# Patient Record
Sex: MALE | Race: WHITE
[De-identification: names, ages, dates, MRNs, and addresses within clinical notes are randomized per-mention and may not be internally consistent; named-entity substitution may affect disease eponyms.]

---

## 2021-05-02 ENCOUNTER — HOSPITAL ENCOUNTER (EMERGENCY)
Dept: HOSPITAL 33 - ED | Age: 76
Discharge: ANOTHER HOSPITAL | End: 2021-05-02
Payer: MEDICARE

## 2021-05-02 VITALS — HEART RATE: 88 BPM | SYSTOLIC BLOOD PRESSURE: 100 MMHG | DIASTOLIC BLOOD PRESSURE: 60 MMHG

## 2021-05-02 VITALS — OXYGEN SATURATION: 97 %

## 2021-05-02 DIAGNOSIS — I26.99: Primary | ICD-10-CM

## 2021-05-02 LAB
ALBUMIN SERPL-MCNC: 4.4 G/DL (ref 3.5–5)
ALP SERPL-CCNC: 66 U/L (ref 38–126)
ALT SERPL-CCNC: 11 U/L (ref 0–50)
ANION GAP SERPL CALC-SCNC: 11.9 MEQ/L (ref 5–15)
APTT PPP: 36.3 SECONDS (ref 24.1–36.1)
ARTERIAL PATENCY WRIST A: YES
AST SERPL QL: 33 U/L (ref 17–59)
BASE EXCESS BLDA CALC-SCNC: 3.4 MMOL/L (ref -2–2)
BASOPHILS # BLD AUTO: 0.03 10*3/UL (ref 0–0.4)
BASOPHILS NFR BLD AUTO: 0.2 % (ref 0–0.4)
BILIRUB BLD-MCNC: 1 MG/DL (ref 0.2–1.3)
BNP SERPL-MCNC: 989 PG/ML (ref 0–900)
BUN SERPL-MCNC: 24 MG/DL (ref 9–20)
CALCIUM SPEC-MCNC: 9.2 MG/DL (ref 8.4–10.2)
CHLORIDE SERPL-SCNC: 101 MMOL/L (ref 98–107)
CO2 SERPL-SCNC: 28 MMOL/L (ref 22–30)
COHGB BLD-MCNC: 0.6 % THGB (ref 0–6.9)
CREAT SERPL-MCNC: 0.98 MG/DL (ref 0.66–1.25)
EOSINOPHIL # BLD AUTO: 0.02 10*3/UL (ref 0–0.5)
GAS PNL BLDA: 15.4
GAS PNL BLDA: 37 C
GFR SERPLBLD BASED ON 1.73 SQ M-ARVRAT: > 60 ML/MIN
GLUCOSE SERPL-MCNC: 130 MG/DL (ref 74–106)
GLUCOSE UR-MCNC: NEGATIVE MG/DL
HCO3 BLDA-SCNC: 26.8 MMOL/L (ref 22–28)
HCT VFR BLD AUTO: 47.9 % (ref 42–50)
HGB BLD-MCNC: 15.3 GM/DL (ref 12.5–18)
INHALED O2 CONCENTRATION: 60 %
INR PPP: 1.52 (ref 0.8–3)
LYMPHOCYTES # SPEC AUTO: 1.17 10*3/UL (ref 1–4.6)
MCH RBC QN AUTO: 31.4 PG (ref 26–32)
MCHC RBC AUTO-ENTMCNC: 31.9 G/DL (ref 32–36)
METHGB MFR BLDA: 1 % (ref 1.4–1.5)
MONOCYTES # BLD AUTO: 1.14 10*3/UL (ref 0–1.3)
O2 A-A PPRESDIFF RESPIRATORY: 112 MM[HG]
PCO2 BLDA: 36 MMHG (ref 35–45)
PLATELET # BLD AUTO: 173 K/MM3 (ref 150–450)
PO2 BLDA: 271 MMHG (ref 75–100)
POTASSIUM BLDA-SCNC: 4.6 MMOL/L (ref 3.5–5.1)
POTASSIUM SERPLBLD-SCNC: 4.7 MMOL/L (ref 3.5–5.1)
PROT SERPL-MCNC: 7.7 G/DL (ref 6.3–8.2)
PROT UR STRIP-MCNC: 100 MG/DL
PROTHROMBIN TIME: 17.2 SECONDS (ref 8.83–12.87)
RBC # BLD AUTO: 4.87 M/MM3 (ref 4.1–5.6)
RBC #/AREA URNS HPF: >101 /HPF (ref 0–2)
SAO2 % BLDA: 97.3 G/DF (ref 94–100)
SAO2 % BLDA: 98.9 % (ref 95–100)
SODIUM SERPL-SCNC: 136 MMOL/L (ref 137–145)
SPECIMEN SOURCE: (no result)
WBC # BLD AUTO: 13.9 K/MM3 (ref 4–10.5)
WBC #/AREA URNS HPF: (no result) /HPF (ref 0–5)

## 2021-05-02 PROCEDURE — 94760 N-INVAS EAR/PLS OXIMETRY 1: CPT

## 2021-05-02 PROCEDURE — 83605 ASSAY OF LACTIC ACID: CPT

## 2021-05-02 PROCEDURE — 36415 COLL VENOUS BLD VENIPUNCTURE: CPT

## 2021-05-02 PROCEDURE — 80053 COMPREHEN METABOLIC PANEL: CPT

## 2021-05-02 PROCEDURE — 87086 URINE CULTURE/COLONY COUNT: CPT

## 2021-05-02 PROCEDURE — 71045 X-RAY EXAM CHEST 1 VIEW: CPT

## 2021-05-02 PROCEDURE — 99285 EMERGENCY DEPT VISIT HI MDM: CPT

## 2021-05-02 PROCEDURE — 85025 COMPLETE CBC W/AUTO DIFF WBC: CPT

## 2021-05-02 PROCEDURE — 93041 RHYTHM ECG TRACING: CPT

## 2021-05-02 PROCEDURE — 51702 INSERT TEMP BLADDER CATH: CPT

## 2021-05-02 PROCEDURE — 82375 ASSAY CARBOXYHB QUANT: CPT

## 2021-05-02 PROCEDURE — 71260 CT THORAX DX C+: CPT

## 2021-05-02 PROCEDURE — 84484 ASSAY OF TROPONIN QUANT: CPT

## 2021-05-02 PROCEDURE — 99291 CRITICAL CARE FIRST HOUR: CPT

## 2021-05-02 PROCEDURE — 96368 THER/DIAG CONCURRENT INF: CPT

## 2021-05-02 PROCEDURE — 96374 THER/PROPH/DIAG INJ IV PUSH: CPT

## 2021-05-02 PROCEDURE — 85610 PROTHROMBIN TIME: CPT

## 2021-05-02 PROCEDURE — 36600 WITHDRAWAL OF ARTERIAL BLOOD: CPT

## 2021-05-02 PROCEDURE — 82803 BLOOD GASES ANY COMBINATION: CPT

## 2021-05-02 PROCEDURE — 83880 ASSAY OF NATRIURETIC PEPTIDE: CPT

## 2021-05-02 PROCEDURE — 81001 URINALYSIS AUTO W/SCOPE: CPT

## 2021-05-02 PROCEDURE — 96375 TX/PRO/DX INJ NEW DRUG ADDON: CPT

## 2021-05-02 PROCEDURE — 94002 VENT MGMT INPAT INIT DAY: CPT

## 2021-05-02 PROCEDURE — 96365 THER/PROPH/DIAG IV INF INIT: CPT

## 2021-05-02 PROCEDURE — 93005 ELECTROCARDIOGRAM TRACING: CPT

## 2021-05-02 PROCEDURE — 85730 THROMBOPLASTIN TIME PARTIAL: CPT

## 2021-05-02 PROCEDURE — 87040 BLOOD CULTURE FOR BACTERIA: CPT

## 2021-05-02 NOTE — ERPHSYRPT
- History of Present Illness


Source: EMS, other (Wife who arrived later)


Exam Limitations: clinical condition


Patient Subjective Stated Complaint: pt here for increase sob today at 1200. 

with a fever


Triage Nursing Assessment: pt arrived per ems on C PAP on, resp labored, pt 

moaning, he is able to answer simple questions, skin hot, dry,pink, chest with 

diminished BS heard


Physician History: 





74 yo wm arrived per EMS on Bipap in respiratory distress. Pt has a h/o 

IPF/HTN/pacer/4L O2 dep. History provided by wife who arrived later. She states 

that pt was dropped from lung transplant list due to obesity. Pt febrile w good 

sats when placed on ER Bipap but very tachyneic and in somewhat distress. CXR 

demonstrated cardiomegaly w pulmonary vascular congestion, so 100mg IV lasix 

given w improvement in condition. He does not want to be intubated per wife.


Timing/Duration: other (6hr)


Activities at Onset: rest


Severity of Dyspnea-Max: severe


Severity of Dyspnea-Current: severe


Possible Cause: frequent episodes


Modifying Factors: Improves With: exertion


Associated Symptoms: edema, fever, No cough, No chest pain/discomfort, No 

insomnia, No loss of appetite, No lightheadedness, No wheezing, No weakness, No 

ankle swelling, No chills, No hemoptysis, No calf pain, No dizziness, No 

heaviness, No heart racing, No lightheadedness, No leg swelling, No muscle 

spasms feet, No muscle spasms hands, No painful breathing, No productive cough


Allergies/Adverse Reactions: 








atorvastatin calcium [From Lipitor] Allergy (Verified 05/02/21 17:51)


   





Hx Tetanus, Diphtheria Vaccination/Date Given: Yes (2005)


Hx Influenza Vaccination/Date Given: Yes (2012)


Hx Pneumococcal Vaccination/Date Given: Yes (2007)


Immunizations Up to Date: Yes





Travel Risk





- International Travel


Have you traveled outside of the country in past 3 weeks: No





- Coronavirus Screening


Are you exhibiting any of the following symptoms?: Yes


Symptoms: Fever


Close contact with a COVID-19 positive Pt in past 14-21 Days: Yes





- Vaccine Status


Have you recieved a Covid-19 vaccination: Yes


: Unknown





- Vaccination Dates


Dates if Unknown: unknown





- Review of Systems


Constitutional: Fever


Eyes: No Symptoms


Ears, Nose, & Throat: No Symptoms


Respiratory: Dyspnea


Cardiac: No Symptoms


Abdominal/Gastrointestinal: No Symptoms


Genitourinary Symptoms: No Symptoms


Musculoskeletal: No Symptoms


Skin: No Symptoms


Neurological: No Symptoms


Psychological: No Symptoms


Endocrine: No Symptoms


Hematologic/Lymphatic: No Symptoms


Immunological/Allergic: No Symptoms





- Past Medical History


Pertinent Past Medical History: Yes


Cardiac History: Coronary Artery Disease, High Cholesterol, Hypertension





- Past Surgical History


Past Surgical History: Yes


Musculoskeletal: Joint Replacement, Orthopedic Surgery


Other Surgical History: SHOULDER SURGERY, HEMORRHOIDS





- Social History


Smoking Status: Never smoker


Exposure to second hand smoke: No


Drug Use: none


Patient Lives Alone: No





- Nursing Vital Signs


Nursing Vital Signs: 


                               Initial Vital Signs











Temperature  101.7 F   05/02/21 17:51


 


Pulse Rate  111 H  05/02/21 17:51


 


Respiratory Rate  22   05/02/21 17:51


 


Blood Pressure  122/68   05/02/21 17:51


 


O2 Sat by Pulse Oximetry  97   05/02/21 17:51








                                   Pain Scale











Pain Intensity                 0

















- Physical Exam


General Appearance: moderate distress


Eye Exam: PERRL/EOMI, eyes nml inspection


Ears, Nose, Throat Exam: normal ENT inspection


Neck Exam: normal inspection


Respiratory Exam: respiratory distress, airway intact, diminished breath sounds 

(B bases)


Cardiovascular/Chest Exam: tachycardia, No murmur


Abdominal/Gastrointestinal Exam: soft, normal bowel sounds (Morbidly obese)


Extremity Exam: pedal edema (1+)


Neurologic Exam: alert, oriented x 3, cooperative


Skin Exam: normal color, warm, dry


Lymphatic Exam: No adenopathy


**SpO2 Interpretation**: normal


SpO2: 97


O2 Delivery: Room Air





- Course


Nursing assessment & vital signs reviewed: Yes


EKG Interpreted by Me: RATE (Afib/Normal QT-QTc/IVCD/Nonspecific ST-Twave 

changes)





- Radiology Exams


  ** Chest


X-ray Interpretation: Interpreted by me (Pacer/Cardiomegaly w pulmonary vascular

congestion)





- CT Exams


  ** Chest


CT Interpretation: Tele-radiologist Report (CTA of chest w multiple RLL PE/R 

heart strain)


Ordered Tests: 


                               Active Orders 24 hr











 Category Date Time Status


 


 EKG-ER Only STAT Care  05/02/21 17:52 Active


 


 Pulse Oximetry (ED) STAT Care  05/02/21 17:52 Active


 


 CHEST 1 VIEW (PORTABLE) Stat Exams  05/02/21 18:09 Taken


 


 CHEST WITH CONTRAST [CT] Stat Exams  05/02/21 18:57 Taken


 


 ABG [ARTERIAL BLOOD GASES] Stat Lab  05/02/21 18:10 Completed


 


 BLOOD CULTURE Stat Lab  05/02/21 21:10 Received


 


 CBC W DIFF Stat Lab  05/02/21 18:01 Completed


 


 CMP Stat Lab  05/02/21 18:01 Completed


 


 CULTURE,URINE Stat Lab  05/02/21 19:09 Received


 


 Lactic Acid Stat Lab  05/02/21 18:10 Completed


 


 NT PRO BNP Stat Lab  05/02/21 18:01 Completed


 


 PROTIME WITH INR Stat Lab  05/02/21 18:01 Completed


 


 PTT Stat Lab  05/02/21 18:01 Completed


 


 TROPONIN Q3H Lab  05/02/21 18:01 Completed


 


 TROPONIN Q3H Lab  05/02/21 21:02 Completed


 


 TROPONIN Q3H Lab  05/03/21 00:00 Ordered


 


 TROPONIN Q3H Lab  05/03/21 03:00 Ordered


 


 TROPONIN Q3H Lab  05/03/21 06:00 Ordered


 


 UA W/RFX UR CULTURE Stat Lab  05/02/21 19:04 Completed


 


 BiPap/CPAP STAT RT  05/02/21 19:45 Active


 


 Pulse Oximetry .continuos RT  05/02/21 19:46 Active








Medication Summary











Generic Name Dose Route Start Last Admin





  Trade Name Freq  PRN Reason Stop Dose Admin


 


Heparin Sodium/Dextrose  25,000 units in 250 mls @ 10 mls/hr  05/02/21 21:30  

05/02/21 21:28





  Heparin 25,000 Units/D5w 250ml Premix  IV  06/01/21 21:29  10 mls/hr





  .Q24H BUTCH   10 mls/hr





    Administration














Discontinued Medications














Generic Name Dose Route Start Last Admin





  Trade Name Freq  PRN Reason Stop Dose Admin


 


Acetaminophen  975 mg  05/02/21 18:16  05/02/21 18:19





  Feverall 650 Mg***  ID  05/02/21 18:17  975 mg





  STAT ONE   Administration


 


Acetaminophen  Confirm  05/02/21 18:18 





  Feverall 650 Mg***  Administered  05/02/21 18:19 





  Dose  





  1,300 mg  





  .ROUTE  





  .STK-MED ONE  


 


Furosemide  100 mg  05/02/21 18:06  05/02/21 18:17





  Furosemide 100mg/10 Ml Vial***  IV  05/02/21 18:07  100 mg





  STAT ONE   Administration


 


Furosemide  Confirm  05/02/21 18:07 





  Furosemide 100mg/10 Ml Vial***  Administered  05/02/21 18:08 





  Dose  





  100 mg  





  .ROUTE  





  .STK-MED ONE  


 


Heparin Sodium (Beef Lung)  5,000 unit  05/02/21 21:08  05/02/21 21:26





  Heparin 5000 Units/0.5 Ml (High Risk Med)***  IV  05/02/21 21:09  5,000 unit





  STAT ONE   Administration


 


Heparin Sodium (Beef Lung)  Confirm  05/02/21 21:18 





  Heparin 5000 Units/0.5 Ml (High Risk Med)***  Administered  05/02/21 21:19 





  Dose  





  5,000 unit  





  .ROUTE  





  .STK-MED ONE  


 


Ceftriaxone Sodium/Dextrose  1 g in 50 mls @ 100 mls/hr  05/02/21 21:08  

05/02/21 21:27





  Rocephin 1 Gm-D5w 50 Ml Bag**  IV  05/02/21 21:37  100 mls/hr





  STAT STA   100 mls/hr





    Administration


 


Ceftriaxone Sodium/Dextrose  Confirm  05/02/21 21:19 





  Rocephin 1 Gm-D5w 50 Ml Bag**  Administered  05/02/21 21:20 





  Dose  





  1 g in 50 mls @ ud  





  IV  





  .STK-MED ONE  











Lab/Rad Data: 


                           Laboratory Result Diagrams





                                 05/02/21 18:01 





                                 05/02/21 18:01 





                               Laboratory Results











  05/02/21 05/02/21 05/02/21 Range/Units





  21:02 19:04 18:10 


 


WBC     (4.0-10.5)  K/mm3


 


RBC     (4.1-5.6)  M/mm3


 


Hgb     (12.5-18.0)  gm/dl


 


Hct     (42-50)  %


 


MCV     ()  fl


 


MCH     (26-32)  pg


 


MCHC     (32-36)  g/dl


 


RDW     (11.5-14.0)  %


 


Plt Count     (150-450)  K/mm3


 


MPV     (7.5-11.0)  fl


 


Gran %     (36.0-66.0)  %


 


Eos # (Auto)     (0-0.5)  


 


Absolute Lymphs (auto)     (1.0-4.6)  


 


Absolute Monos (auto)     (0.0-1.3)  


 


Lymphocytes %     (24.0-44.0)  %


 


Monocytes %     (0.0-12.0)  %


 


Eosinophils %     (0.00-5.0)  %


 


Basophils %     (0.0-0.4)  %


 


Absolute Granulocytes     (1.4-6.9)  


 


Basophils #     (0-0.4)  


 


PT     (8.83-12.87)  SECONDS


 


INR     (0.8-3.0)  


 


APTT     (24.1-36.1)  SECONDS


 


Puncture Site    LEFT RADIAL  


 


pCO2    36  (35-45)  mmHg


 


pO2    271 H*  ()  mmHg


 


Base Excess    3.4 H  (-2.0-2.0)  


 


O2 Saturation    97.3  ()  g/dF


 


ABG pH    7.48 H  (7.35-7.45)  


 


ABG HCO3    26.8  (22-28)  


 


ABG O2 Sat (Measured)    98.9  ()  %


 


Micheal Test    YES  


 


A-a Gradient    112  


 


a/A Ratio    0.71  


 


Hemoglobin    15.4  


 


Carboxyhemoglobin    0.6  (0.0-6.9)  % THgb


 


Methemoglobin    1.0 L  (1.4-1.5)  %


 


Temperature    37.0  C


 


POC O2 Flow Rate    60  %


 


Vent Mode    BiPAP  


 


Sodium     (137-145)  mmol/L


 


Potassium    4.6  (3.5-5.1)  mmol/L


 


Chloride     ()  mmol/L


 


Carbon Dioxide     (22-30)  mmol/L


 


Anion Gap     (5-15)  MEQ/L


 


BUN     (9-20)  mg/dL


 


Creatinine     (0.66-1.25)  mg/dL


 


Estimated GFR     ML/MIN


 


Glucose     ()  mg/dL


 


Lactic Acid     (0.4-2.0)  


 


Calcium     (8.4-10.2)  mg/dL


 


Total Bilirubin     (0.2-1.3)  mg/dL


 


AST     (17-59)  U/L


 


ALT     (0-50)  U/L


 


Alkaline Phosphatase     ()  U/L


 


Troponin I  0.016    (0.000-0.034)  ng/mL


 


NT-Pro-B Natriuret Pep     (0-900)  pg/mL


 


Serum Total Protein     (6.3-8.2)  g/dL


 


Albumin     (3.5-5.0)  g/dL


 


Urine Color   YELLOW   (YELLOW)  


 


Urine Appearance   SLIGHTLY CLOUDY   (CLEAR)  


 


Urine pH   9.0   (5-6)  


 


Ur Specific Gravity   1.024   (1.005-1.025)  


 


Urine Protein   100   (Negative)  


 


Urine Ketones   NEGATIVE   (NEGATIVE)  


 


Urine Blood   MODERATE   (0-5)  Tyson/ul


 


Urine Nitrite   NEGATIVE   (NEGATIVE)  


 


Urine Bilirubin   NEGATIVE   (NEGATIVE)  


 


Urine Urobilinogen   4   (0-1)  mg/dL


 


Ur Leukocyte Esterase   NEGATIVE   (NEGATIVE)  


 


Urine WBC (Auto)   6-10   (0-5)  /HPF


 


Urine RBC (Auto)   >101   (0-2)  /HPF


 


U Epithel Cells (Auto)   NONE   (FEW)  /HPF


 


Urine Bacteria (Auto)   NONE SEEN   (NEGATIVE)  /HPF


 


Urine Mucus (Auto)   SLIGHT   (NEGATIVE)  /HPF


 


Urine Culture Reflexed   ORDERED SEPARATELY   (NO)  


 


Urine Glucose   NEGATIVE   (NEGATIVE)  mg/dL














  05/02/21 05/02/21 05/02/21 Range/Units





  18:10 18:01 18:01 


 


WBC     (4.0-10.5)  K/mm3


 


RBC     (4.1-5.6)  M/mm3


 


Hgb     (12.5-18.0)  gm/dl


 


Hct     (42-50)  %


 


MCV     ()  fl


 


MCH     (26-32)  pg


 


MCHC     (32-36)  g/dl


 


RDW     (11.5-14.0)  %


 


Plt Count     (150-450)  K/mm3


 


MPV     (7.5-11.0)  fl


 


Gran %     (36.0-66.0)  %


 


Eos # (Auto)     (0-0.5)  


 


Absolute Lymphs (auto)     (1.0-4.6)  


 


Absolute Monos (auto)     (0.0-1.3)  


 


Lymphocytes %     (24.0-44.0)  %


 


Monocytes %     (0.0-12.0)  %


 


Eosinophils %     (0.00-5.0)  %


 


Basophils %     (0.0-0.4)  %


 


Absolute Granulocytes     (1.4-6.9)  


 


Basophils #     (0-0.4)  


 


PT    17.2 H  (8.83-12.87)  SECONDS


 


INR    1.52  (0.8-3.0)  


 


APTT    36.3 H  (24.1-36.1)  SECONDS


 


Puncture Site     


 


pCO2     (35-45)  mmHg


 


pO2     ()  mmHg


 


Base Excess     (-2.0-2.0)  


 


O2 Saturation     ()  g/dF


 


ABG pH     (7.35-7.45)  


 


ABG HCO3     (22-28)  


 


ABG O2 Sat (Measured)     ()  %


 


Micheal Test     


 


A-a Gradient     


 


a/A Ratio     


 


Hemoglobin     


 


Carboxyhemoglobin     (0.0-6.9)  % THgb


 


Methemoglobin     (1.4-1.5)  %


 


Temperature     C


 


POC O2 Flow Rate     %


 


Vent Mode     


 


Sodium     (137-145)  mmol/L


 


Potassium     (3.5-5.1)  mmol/L


 


Chloride     ()  mmol/L


 


Carbon Dioxide     (22-30)  mmol/L


 


Anion Gap     (5-15)  MEQ/L


 


BUN     (9-20)  mg/dL


 


Creatinine     (0.66-1.25)  mg/dL


 


Estimated GFR     ML/MIN


 


Glucose     ()  mg/dL


 


Lactic Acid  1.5    (0.4-2.0)  


 


Calcium     (8.4-10.2)  mg/dL


 


Total Bilirubin     (0.2-1.3)  mg/dL


 


AST     (17-59)  U/L


 


ALT     (0-50)  U/L


 


Alkaline Phosphatase     ()  U/L


 


Troponin I   < 0.012   (0.000-0.034)  ng/mL


 


NT-Pro-B Natriuret Pep     (0-900)  pg/mL


 


Serum Total Protein     (6.3-8.2)  g/dL


 


Albumin     (3.5-5.0)  g/dL


 


Urine Color     (YELLOW)  


 


Urine Appearance     (CLEAR)  


 


Urine pH     (5-6)  


 


Ur Specific Gravity     (1.005-1.025)  


 


Urine Protein     (Negative)  


 


Urine Ketones     (NEGATIVE)  


 


Urine Blood     (0-5)  Tyson/ul


 


Urine Nitrite     (NEGATIVE)  


 


Urine Bilirubin     (NEGATIVE)  


 


Urine Urobilinogen     (0-1)  mg/dL


 


Ur Leukocyte Esterase     (NEGATIVE)  


 


Urine WBC (Auto)     (0-5)  /HPF


 


Urine RBC (Auto)     (0-2)  /HPF


 


U Epithel Cells (Auto)     (FEW)  /HPF


 


Urine Bacteria (Auto)     (NEGATIVE)  /HPF


 


Urine Mucus (Auto)     (NEGATIVE)  /HPF


 


Urine Culture Reflexed     (NO)  


 


Urine Glucose     (NEGATIVE)  mg/dL














  05/02/21 05/02/21 Range/Units





  18:01 18:01 


 


WBC   13.9 H  (4.0-10.5)  K/mm3


 


RBC   4.87  (4.1-5.6)  M/mm3


 


Hgb   15.3  (12.5-18.0)  gm/dl


 


Hct   47.9  (42-50)  %


 


MCV   98.4  ()  fl


 


MCH   31.4  (26-32)  pg


 


MCHC   31.9 L  (32-36)  g/dl


 


RDW   14.4 H  (11.5-14.0)  %


 


Plt Count   173  (150-450)  K/mm3


 


MPV   10.2  (7.5-11.0)  fl


 


Gran %   83.1 H  (36.0-66.0)  %


 


Eos # (Auto)   0.02  (0-0.5)  


 


Absolute Lymphs (auto)   1.17  (1.0-4.6)  


 


Absolute Monos (auto)   1.14  (0.0-1.3)  


 


Lymphocytes %   8.4 L  (24.0-44.0)  %


 


Monocytes %   8.2  (0.0-12.0)  %


 


Eosinophils %   0.1  (0.00-5.0)  %


 


Basophils %   0.2  (0.0-0.4)  %


 


Absolute Granulocytes   11.51 H  (1.4-6.9)  


 


Basophils #   0.03  (0-0.4)  


 


PT    (8.83-12.87)  SECONDS


 


INR    (0.8-3.0)  


 


APTT    (24.1-36.1)  SECONDS


 


Puncture Site    


 


pCO2    (35-45)  mmHg


 


pO2    ()  mmHg


 


Base Excess    (-2.0-2.0)  


 


O2 Saturation    ()  g/dF


 


ABG pH    (7.35-7.45)  


 


ABG HCO3    (22-28)  


 


ABG O2 Sat (Measured)    ()  %


 


Micheal Test    


 


A-a Gradient    


 


a/A Ratio    


 


Hemoglobin    


 


Carboxyhemoglobin    (0.0-6.9)  % THgb


 


Methemoglobin    (1.4-1.5)  %


 


Temperature    C


 


POC O2 Flow Rate    %


 


Vent Mode    


 


Sodium  136 L   (137-145)  mmol/L


 


Potassium  4.7   (3.5-5.1)  mmol/L


 


Chloride  101   ()  mmol/L


 


Carbon Dioxide  28   (22-30)  mmol/L


 


Anion Gap  11.9   (5-15)  MEQ/L


 


BUN  24 H   (9-20)  mg/dL


 


Creatinine  0.98   (0.66-1.25)  mg/dL


 


Estimated GFR  > 60.0   ML/MIN


 


Glucose  130 H   ()  mg/dL


 


Lactic Acid    (0.4-2.0)  


 


Calcium  9.2   (8.4-10.2)  mg/dL


 


Total Bilirubin  1.00   (0.2-1.3)  mg/dL


 


AST  33   (17-59)  U/L


 


ALT  11   (0-50)  U/L


 


Alkaline Phosphatase  66   ()  U/L


 


Troponin I    (0.000-0.034)  ng/mL


 


NT-Pro-B Natriuret Pep  989 H   (0-900)  pg/mL


 


Serum Total Protein  7.7   (6.3-8.2)  g/dL


 


Albumin  4.4   (3.5-5.0)  g/dL


 


Urine Color    (YELLOW)  


 


Urine Appearance    (CLEAR)  


 


Urine pH    (5-6)  


 


Ur Specific Gravity    (1.005-1.025)  


 


Urine Protein    (Negative)  


 


Urine Ketones    (NEGATIVE)  


 


Urine Blood    (0-5)  Tyson/ul


 


Urine Nitrite    (NEGATIVE)  


 


Urine Bilirubin    (NEGATIVE)  


 


Urine Urobilinogen    (0-1)  mg/dL


 


Ur Leukocyte Esterase    (NEGATIVE)  


 


Urine WBC (Auto)    (0-5)  /HPF


 


Urine RBC (Auto)    (0-2)  /HPF


 


U Epithel Cells (Auto)    (FEW)  /HPF


 


Urine Bacteria (Auto)    (NEGATIVE)  /HPF


 


Urine Mucus (Auto)    (NEGATIVE)  /HPF


 


Urine Culture Reflexed    (NO)  


 


Urine Glucose    (NEGATIVE)  mg/dL














- Progress


Progress Note: 


Pt accepted by Dr. Westfall at [facility]


05/02/21 21:18





05/02/21 23:04


Rad called and stated that pt had multiple RLL PE's


After initial 100mg IV Lasix, pt diuresed 1L and rested quite comfortably on 

Bipap


Pt accepted by Dr. Westfall, [facility]. 1gm IV Rocephin given at her request 

after blood cultures x2.


5000u Heparin bolus given/heparin drip 1000u/hr per Dr. Westfall. Dr. Westfall 

aware that pt on Eliquis.


Pt stable when EMS assumed care.


Counseled pt/family regarding: lab results, rad results





- Departure


Departure Disposition: Transfer


Clinical Impression: 


 Pulmonary emboli, Fever, Congestive heart failure (CHF)





Condition: Stable


Critical Care Time: Yes


Critical Care Time(excluding separately billable procedures): Critical 30-74 

mins


Referrals: 


FERNANDO MCLAIN MD [Primary Care Provider] - 


Instructions:  Heart Failure

## 2021-05-03 NOTE — XRAY
Indication: Respiratory distress, fever, elevated WBC, and abnormal blood

gases.



Multiple contiguous axial images obtained through the chest using 100 cc

Isovue 370 contrast and PE protocol.



Comparison: None



There is satisfactory opacification of the pulmonary arteries including lobar

and segmental.  Respiration artifact limits evaluation of the more distal

branches.  No obvious pulmonary embolus.  Heart is enlarged with left single

lead pacemaker.  Aorta is minimally acute sclerotic without

aneurysm/dissection.  Small right hilar calcified node.  No pathologic

mediastinal/hilar lymphadenopathy.



Lungs demonstrate demonstrates scattered peripheral fibrosis/scarring and

moderate pulmonary emphysema.  No suspicious pulmonary mass, infiltrate, or

effusion.



Bony thorax intact with minimal degenerative changes.



Limited upper abdomen demonstrate fatty liver.



Impression:

1.  Pulmonary embolus evaluation limited due to respiration artifact.  No

obvious central pulmonary embolus.

2.  Cardiomegaly without jeannie CHF.

3.  Diffuse scattered fibrosis/scarring and pulmonary emphysema.

4.  Fatty liver.



Comment: Preliminary interpretation was made by [facility].  No critical discrepancy.

## 2021-05-03 NOTE — XRAY
Indication: Short of breath.



Comparison: None



Portable chest rotated and underinflated accentuating cardiopulmonary

structures.  Heart is enlarged with left single lead pacemaker.  Prominent

interstitial lung markings without focal infiltrate, consolidation, or large

effusion.  Bony thorax intact with the distal left clavicle resection.



Impression:

1.  Underinflation, rotation, and prominent interstitial lung markings.

Pneumonia not completely excluded in the right clinical setting.

2.  Cardiomegaly without CHF.



Comment: Preliminary interpretation was made by [facility].  No critical discrepancy.